# Patient Record
Sex: FEMALE | Race: ASIAN | URBAN - METROPOLITAN AREA
[De-identification: names, ages, dates, MRNs, and addresses within clinical notes are randomized per-mention and may not be internally consistent; named-entity substitution may affect disease eponyms.]

---

## 2023-08-30 ENCOUNTER — APPOINTMENT (RX ONLY)
Dept: URBAN - METROPOLITAN AREA CLINIC 45 | Facility: CLINIC | Age: 33
Setting detail: DERMATOLOGY
End: 2023-08-30

## 2023-08-30 ENCOUNTER — APPOINTMENT (RX ONLY)
Dept: URBAN - METROPOLITAN AREA CLINIC 44 | Facility: CLINIC | Age: 33
Setting detail: DERMATOLOGY
End: 2023-08-30

## 2023-08-30 DIAGNOSIS — Q828 OTHER SPECIFIED ANOMALIES OF SKIN: ICD-10-CM | Status: STABLE

## 2023-08-30 DIAGNOSIS — L259 CONTACT DERMATITIS AND OTHER ECZEMA, UNSPECIFIED CAUSE: ICD-10-CM | Status: INADEQUATELY CONTROLLED

## 2023-08-30 DIAGNOSIS — Z41.9 ENCOUNTER FOR PROCEDURE FOR PURPOSES OTHER THAN REMEDYING HEALTH STATE, UNSPECIFIED: ICD-10-CM

## 2023-08-30 DIAGNOSIS — Q819 OTHER SPECIFIED ANOMALIES OF SKIN: ICD-10-CM | Status: STABLE

## 2023-08-30 DIAGNOSIS — Q826 OTHER SPECIFIED ANOMALIES OF SKIN: ICD-10-CM | Status: STABLE

## 2023-08-30 PROBLEM — L23.9 ALLERGIC CONTACT DERMATITIS, UNSPECIFIED CAUSE: Status: ACTIVE | Noted: 2023-08-30

## 2023-08-30 PROBLEM — L85.8 OTHER SPECIFIED EPIDERMAL THICKENING: Status: ACTIVE | Noted: 2023-08-30

## 2023-08-30 PROCEDURE — ? COSMETIC QUOTE

## 2023-08-30 PROCEDURE — ? PRESCRIPTION MEDICATION MANAGEMENT

## 2023-08-30 PROCEDURE — ? PRESCRIPTION

## 2023-08-30 PROCEDURE — ? MEDICATION COUNSELING

## 2023-08-30 PROCEDURE — ? COUNSELING

## 2023-08-30 PROCEDURE — 99203 OFFICE O/P NEW LOW 30 MIN: CPT

## 2023-08-30 RX ORDER — BETAMETHASONE DIPROPIONATE 0.5 MG/G
OINTMENT, AUGMENTED TOPICAL TWICE DAILY
Qty: 50 | Refills: 1 | Status: ERX | COMMUNITY
Start: 2023-08-30

## 2023-08-30 RX ORDER — HYDROXYZINE HYDROCHLORIDE 25 MG/1
TABLET, FILM COATED ORAL EVERY NIGHT
Qty: 30 | Refills: 0 | Status: ERX | COMMUNITY
Start: 2023-08-30

## 2023-08-30 RX ADMIN — HYDROXYZINE HYDROCHLORIDE: 25 TABLET, FILM COATED ORAL at 00:00

## 2023-08-30 RX ADMIN — BETAMETHASONE DIPROPIONATE: 0.5 OINTMENT, AUGMENTED TOPICAL at 00:00

## 2023-08-30 ASSESSMENT — LOCATION ZONE DERM
LOCATION ZONE: FACE
LOCATION ZONE: LEG
LOCATION ZONE: TRUNK

## 2023-08-30 ASSESSMENT — LOCATION DETAILED DESCRIPTION DERM
LOCATION DETAILED: RIGHT INFERIOR CENTRAL MALAR CHEEK
LOCATION DETAILED: PERIUMBILICAL SKIN
LOCATION DETAILED: RIGHT ANTERIOR PROXIMAL THIGH
LOCATION DETAILED: LEFT ANTERIOR PROXIMAL THIGH

## 2023-08-30 ASSESSMENT — LOCATION SIMPLE DESCRIPTION DERM
LOCATION SIMPLE: RIGHT CHEEK
LOCATION SIMPLE: ABDOMEN
LOCATION SIMPLE: RIGHT THIGH
LOCATION SIMPLE: LEFT THIGH

## 2023-08-30 NOTE — PROCEDURE: MEDICATION COUNSELING
Left voicemail informing pt that orders have been placed per Dr Ruiz's notes below.   Wartpeel Counseling:  I discussed with the patient the risks of Wartpeel including but not limited to erythema, scaling, itching, weeping, crusting, and pain.

## 2023-08-30 NOTE — PROCEDURE: COSMETIC QUOTE
Anesthesia 6 Price/Unit (In Dollars- Use Only Numbers And Decimals): 0.00
Laser 4 Price/Unit (In Dollars- Use Only Numbers And Decimals): 475.00
Laser 14: Laser Hair Removal 1/2 Arms
Implant 9 Units: 0
Injectable 6 Price/Unit (In Dollars- Use Only Numbers And Decimals): 700.00
Body Procedure 8 Price/Unit (In Dollars- Use Only Numbers And Decimals): 1200
Face Procedure 8: Hydrafacial Deluxe
Laser 11: Laser Hair Removal Lip/Chin
Injectable 3 Lo/Unit (In Dollars- Use Only Numbers And Decimals): 15.00
Face Procedure 5: Viva Add on Neck
Body Procedure 1 Price/Unit (In Dollars- Use Only Numbers And Decimals): 550.00
Body Procedure 5 Price/Unit (In Dollars- Use Only Numbers And Decimals): 350.00
Laser 20 Price/Unit (In Dollars- Use Only Numbers And Decimals): 379.00
Injectable 10: Tear Trough Filler
Laser 8: CoolPeel Full face
Laser 2: Laser Hair Removal Full Body
Body Procedure 2 Price/Unit (In Dollars- Use Only Numbers And Decimals): 800.00
Laser 5: Laser Hair Removal Brazilian
Face Procedure 2: coolpeel laser full face
Body Procedure 9: Scalp PRP
Injectable 7: Lip filler
Injectable 4: Facial Filler
Body Procedure 6: CoolPeel
Injectable 1: Botox
Laser 15 Price/Unit (In Dollars- Use Only Numbers And Decimals): 499.00
Breast Procedure 2 Price/Unit (In Dollars- Use Only Numbers And Decimals): 450.00
Body Procedure 3: Viva abdominal Scar
Laser 12 Price/Unit (In Dollars- Use Only Numbers And Decimals): 199.00
Include Sales Tax On Surgeon's Fees: Yes
Laser 12 Units: 8
Laser 3: Laser Hair Removal Full Legs
Injectable 8: Lyft
Laser 6: Laser Hair Removal Bikini
Face Procedure 3: Viva Full Face
Body Procedure 10: SkinPen Microneedling
Misc Procedure 1 Price/Unit (In Dollars- Use Only Numbers And Decimals): 75.00
Use Name For Above Discounts: No
Injectable 5: Voluma
Breast Procedure 3 Lo/Unit (In Dollars- Use Only Numbers And Decimals): 789.00
Laser 16 Price/Unit (In Dollars- Use Only Numbers And Decimals): 299.00
Laser 12 Percentage Discount: 15
Body Procedure 7: Aisha Sutherland
Injectable 2: Botox lip flip
Body Procedure 4: Add on extractions
Laser 10 Price/Unit (In Dollars- Use Only Numbers And Decimals): 99.00
Laser 19: Laser Hair Removal Full Back
Detail Level: Zone
Face Procedure 4 Price/Unit (In Dollars- Use Only Numbers And Decimals): 250.00
Laser 17: Laser Hair Removal Abdomen
Breast Procedure 1: Arms and Hands CoolPeel
Face Procedure 1 Price/Unit (In Dollars- Use Only Numbers And Decimals): 429.00
Laser 1 Price/Unit (In Dollars- Use Only Numbers And Decimals): 200.00
Injectable 3: Botox (forehead)
Body Procedure 5: Viva Around mouth only
Body Procedure 1: Keravive
Breast Procedure 1 Price/Unit (In Dollars- Use Only Numbers And Decimals): 1100.00
Laser 20: Laser Hair Removal Buttocks
Body Procedure 2: Viva with IPL
Laser 8 Price/Unit (In Dollars- Use Only Numbers And Decimals): 900.00
Laser 2 Price/Unit (In Dollars- Use Only Numbers And Decimals): 1000.00
Breast Procedure 2: CoolPeel spot treatment
Laser 15: Laser Hair Removal Full Arms
Laser 5 Price/Unit (In Dollars- Use Only Numbers And Decimals): 399.00
Laser 12: Laser hair Removal Full Face/ Beard
Face Procedure 9: IPL Treatment
Face Procedure 6: V-Beam Laser
Injectable  11: Volux
Laser 9: Cherry Angeomas
Laser 9 Price/Unit (In Dollars- Use Only Numbers And Decimals): 200-300
Laser 18: Laser Hair Removal 1/2 Back
Misc Procedure 1: Add-on Milia Extractions
Face Procedure 6 Price/Unit (In Dollars- Use Only Numbers And Decimals): 300.450
Laser 3 Price/Unit (In Dollars- Use Only Numbers And Decimals): 675.00
Breast Procedure 3: Microneedling with PRP
Laser 16: Laser Hair Removal Chest
Face Procedure 10: SkinPen micronedling
Laser 13: Laser Hair Removal Under Arms
Body Procedure 7 Price/Unit (In Dollars- Use Only Numbers And Decimals): 1400.00
Face Procedure 7: IPL
Injectable 2 Lo/Unit (In Dollars- Use Only Numbers And Decimals): 50.00
Laser 10: Laser hair Removal [place]
Face Procedure 4: Add-On IPL laser
Laser 4: Laser Hair Removal 1/2 Leg
Laser 19 Price/Unit (In Dollars- Use Only Numbers And Decimals): 500.00
Laser 7: Laser Hair Removal Beard Outline
Injectable 9: Kybella
Laser 1: Add on IPL
Facility Fee Units (Optional): 1
Face Procedure 1: VI Precision plus peel
Injectable 6: Juvederm Ultra XC (lips)
Body Procedure 8: Coolpeel lower legs

## 2023-08-30 NOTE — PROCEDURE: MEDICATION COUNSELING
done Acitretin Pregnancy And Lactation Text: This medication is Pregnancy Category X and should not be given to women who are pregnant or may become pregnant in the future. This medication is excreted in breast milk.

## 2023-08-30 NOTE — PROCEDURE: PRESCRIPTION MEDICATION MANAGEMENT
Discontinue Regimen: Clindamycin 300 mg and Triamcinalone cream
Render In Strict Bullet Format?: No
Detail Level: Zone
Plan: She is allergic to the Mupirocin oint

## 2023-08-30 NOTE — HPI: RASH
What Type Of Note Output Would You Prefer (Optional)?: Bullet Format
Is This A New Presentation, Or A Follow-Up?: Rash
Additional History: New pt \\n\\[id number] ago went for body wax, pt said the wax went into the navel-watery discharge \\nFungus infection medicine and it’s spreading b/c of wrong medication that was given

## 2023-10-25 ENCOUNTER — APPOINTMENT (RX ONLY)
Dept: URBAN - METROPOLITAN AREA CLINIC 44 | Facility: CLINIC | Age: 33
Setting detail: DERMATOLOGY
End: 2023-10-25

## 2023-10-25 ENCOUNTER — RX ONLY (OUTPATIENT)
Age: 33
Setting detail: RX ONLY
End: 2023-10-25

## 2023-10-25 DIAGNOSIS — L259 CONTACT DERMATITIS AND OTHER ECZEMA, UNSPECIFIED CAUSE: ICD-10-CM | Status: RESOLVING

## 2023-10-25 PROBLEM — L23.9 ALLERGIC CONTACT DERMATITIS, UNSPECIFIED CAUSE: Status: ACTIVE | Noted: 2023-10-25

## 2023-10-25 PROCEDURE — ? PRESCRIPTION MEDICATION MANAGEMENT

## 2023-10-25 PROCEDURE — 99213 OFFICE O/P EST LOW 20 MIN: CPT

## 2023-10-25 PROCEDURE — ? MEDICATION COUNSELING

## 2023-10-25 PROCEDURE — ? COUNSELING

## 2023-10-25 RX ORDER — BETAMETHASONE DIPROPIONATE 0.5 MG/G
OINTMENT, AUGMENTED TOPICAL TWICE DAILY
Qty: 50 | Refills: 1 | Status: ACTIVE

## 2023-10-25 ASSESSMENT — LOCATION DETAILED DESCRIPTION DERM
LOCATION DETAILED: RIGHT INFERIOR ANTERIOR NECK
LOCATION DETAILED: PERIUMBILICAL SKIN
LOCATION DETAILED: LEFT VENTRAL PROXIMAL FOREARM

## 2023-10-25 ASSESSMENT — LOCATION SIMPLE DESCRIPTION DERM
LOCATION SIMPLE: LEFT FOREARM
LOCATION SIMPLE: ABDOMEN
LOCATION SIMPLE: RIGHT ANTERIOR NECK

## 2023-10-25 ASSESSMENT — LOCATION ZONE DERM
LOCATION ZONE: NECK
LOCATION ZONE: ARM
LOCATION ZONE: TRUNK

## 2023-10-25 NOTE — PROCEDURE: PRESCRIPTION MEDICATION MANAGEMENT
Detail Level: Zone
Render In Strict Bullet Format?: No
Continue Regimen: Betamethasone oint prn and refill sent in
Initiate Treatment: OTC Loraine cream and try the Hydroxyzine again

## 2023-10-25 NOTE — PROCEDURE: MEDICATION COUNSELING
Cellcept Pregnancy And Lactation Text: This medication is Pregnancy Category D and isn't considered safe during pregnancy. It is unknown if this medication is excreted in breast milk. no
